# Patient Record
Sex: FEMALE | Race: WHITE
[De-identification: names, ages, dates, MRNs, and addresses within clinical notes are randomized per-mention and may not be internally consistent; named-entity substitution may affect disease eponyms.]

---

## 2017-02-15 ENCOUNTER — HOSPITAL ENCOUNTER (OUTPATIENT)
Dept: HOSPITAL 45 - C.LABMFLN | Age: 62
Discharge: HOME | End: 2017-02-15
Attending: INTERNAL MEDICINE
Payer: COMMERCIAL

## 2017-02-15 DIAGNOSIS — E03.9: Primary | ICD-10-CM

## 2017-02-15 LAB — TSH SERPL-ACNC: 1.05 UIU/ML (ref 0.3–4.5)

## 2017-03-21 ENCOUNTER — HOSPITAL ENCOUNTER (OUTPATIENT)
Dept: HOSPITAL 45 - C.RADBC | Age: 62
Discharge: HOME | End: 2017-03-21
Attending: ANESTHESIOLOGY
Payer: COMMERCIAL

## 2017-03-21 DIAGNOSIS — M79.645: ICD-10-CM

## 2017-03-21 DIAGNOSIS — M79.644: Primary | ICD-10-CM

## 2017-03-21 NOTE — DIAGNOSTIC IMAGING REPORT
RIGHT THUMB 3 VIEWS



CLINICAL HISTORY: Right thumb pain     



COMPARISON: None.



DISCUSSION: There are mild osteoarthritic changes the level the first

carpometacarpal joint and interphalangeal joint. No acute fractures are

visualized. There are no erosive or destructive changes.    



IMPRESSION: Mild degenerative change. No acute fractures.







Electronically signed by:  Hector Parham M.D.

3/21/2017 2:37 PM



Dictated Date/Time:  3/21/2017 2:36 PM

## 2017-03-21 NOTE — DIAGNOSTIC IMAGING REPORT
LEFT THUMB 3 VIEWS



CLINICAL HISTORY: Left thumb pain     



COMPARISON: None.



DISCUSSION: There are moderate osteoarthritic changes the level the first

carpometacarpal joint. No acute fractures are visualized. Mild degenerative

changes are present the level of the interphalangeal joint.    



IMPRESSION: No acute fractures. Moderate osteoarthritic changes at the level of

the first carpal metacarpal joint.







Electronically signed by:  Hector Parham M.D.

3/21/2017 2:36 PM



Dictated Date/Time:  3/21/2017 2:35 PM

## 2017-10-25 ENCOUNTER — HOSPITAL ENCOUNTER (OUTPATIENT)
Dept: HOSPITAL 45 - C.MAMM | Age: 62
Discharge: HOME | End: 2017-10-25
Attending: FAMILY MEDICINE
Payer: COMMERCIAL

## 2017-10-25 DIAGNOSIS — Z12.31: Primary | ICD-10-CM

## 2017-10-26 NOTE — MAMMOGRAPHY REPORT
BILATERAL DIGITAL SCREENING MAMMOGRAM TOMOSYNTHESIS WITH CAD: 10/25/2017

CLINICAL HISTORY: Routine screening.  Patient has no complaints.  





TECHNIQUE:  Breast tomosynthesis in addition to standard 2D mammography was performed. Current study 
was also evaluated with a Computer Aided Detection (CAD) system.  



COMPARISON: Comparison is made to exams dated:  10/20/2016 mammogram, 10/12/2015 mammogram, 10/9/2014
 mammogram, 9/25/2013 mammogram, 8/27/2012 mammogram, and 8/24/2011 mammogram - [facility].   



BREAST COMPOSITION:  The tissue of both breasts is heterogeneously dense, which may obscure small mas
ses.  



FINDINGS:  No suspicious masses, calcifications, or areas of architectural distortion are noted in ei
ther breast. There has been no significant interval change compared to prior exams.  



IMPRESSION:  ACR BI-RADS CATEGORY 1: NEGATIVE

There is no mammographic evidence of malignancy. A 1 year screening mammogram is recommended.  The pa
tient will receive written notification of the results.  





Approximately 10% of breast cancers are not detected with mammography. A negative mammographic report
 should not delay biopsy if a clinically suggestive mass is present.



Hannah Layton M.D.          

ah/:10/25/2017 16:04:58  



Imaging Technologist: Gladys MCCARTHY(R)(M), [facility]

letter sent: Normal 1/2  

BI-RADS Code: ACR BI-RADS Category 1: Negative

## 2018-07-26 ENCOUNTER — HOSPITAL ENCOUNTER (OUTPATIENT)
Dept: HOSPITAL 45 - C.LABMFLN | Age: 63
Discharge: HOME | End: 2018-07-26
Attending: NURSE PRACTITIONER
Payer: COMMERCIAL

## 2018-07-26 DIAGNOSIS — D70.9: ICD-10-CM

## 2018-07-26 DIAGNOSIS — R53.83: Primary | ICD-10-CM

## 2018-07-26 DIAGNOSIS — E03.9: ICD-10-CM

## 2018-07-26 LAB
BASOPHILS # BLD: 0.03 K/UL (ref 0–0.2)
BASOPHILS NFR BLD: 1 %
EOS ABS #: 0.06 K/UL (ref 0–0.5)
EOSINOPHIL NFR BLD AUTO: 220 K/UL (ref 130–400)
HCT VFR BLD CALC: 37.6 % (ref 37–47)
HGB BLD-MCNC: 12.4 G/DL (ref 12–16)
IG#: 0 K/UL (ref 0–0.02)
IMM GRANULOCYTES NFR BLD AUTO: 50 %
LYMPHOCYTES # BLD: 1.54 K/UL (ref 1.2–3.4)
MCH RBC QN AUTO: 29.7 PG (ref 25–34)
MCHC RBC AUTO-ENTMCNC: 33 G/DL (ref 32–36)
MCV RBC AUTO: 90.2 FL (ref 80–100)
MONO ABS #: 0.34 K/UL (ref 0.11–0.59)
MONOCYTES NFR BLD: 11 %
NEUT ABS #: 1.11 K/UL (ref 1.4–6.5)
NEUTROPHILS # BLD AUTO: 1.9 %
NEUTROPHILS NFR BLD AUTO: 36.1 %
PMV BLD AUTO: 10.1 FL (ref 7.4–10.4)
RED CELL DISTRIBUTION WIDTH CV: 13.2 % (ref 11.5–14.5)
RED CELL DISTRIBUTION WIDTH SD: 43.2 FL (ref 36.4–46.3)
WBC # BLD AUTO: 3.08 K/UL (ref 4.8–10.8)